# Patient Record
Sex: MALE
[De-identification: names, ages, dates, MRNs, and addresses within clinical notes are randomized per-mention and may not be internally consistent; named-entity substitution may affect disease eponyms.]

---

## 2017-08-24 ENCOUNTER — APPOINTMENT (OUTPATIENT)
Dept: ORTHOPEDIC SURGERY | Facility: CLINIC | Age: 57
End: 2017-08-24
Payer: COMMERCIAL

## 2017-08-24 VITALS
WEIGHT: 180 LBS | SYSTOLIC BLOOD PRESSURE: 137 MMHG | HEART RATE: 56 BPM | HEIGHT: 67 IN | BODY MASS INDEX: 28.25 KG/M2 | DIASTOLIC BLOOD PRESSURE: 78 MMHG

## 2017-08-24 DIAGNOSIS — M25.531 PAIN IN RIGHT WRIST: ICD-10-CM

## 2017-08-24 DIAGNOSIS — M25.532 PAIN IN RIGHT WRIST: ICD-10-CM

## 2017-08-24 DIAGNOSIS — M72.2 PLANTAR FASCIAL FIBROMATOSIS: ICD-10-CM

## 2017-08-24 PROCEDURE — 73110 X-RAY EXAM OF WRIST: CPT | Mod: 50

## 2017-08-24 PROCEDURE — 99214 OFFICE O/P EST MOD 30 MIN: CPT

## 2017-08-24 PROCEDURE — 73650 X-RAY EXAM OF HEEL: CPT | Mod: RT

## 2017-08-24 RX ORDER — CLOPIDOGREL BISULFATE 75 MG/1
75 TABLET, FILM COATED ORAL
Qty: 90 | Refills: 0 | Status: ACTIVE | COMMUNITY
Start: 2017-05-01

## 2017-08-24 RX ORDER — ATORVASTATIN CALCIUM 40 MG/1
40 TABLET, FILM COATED ORAL
Qty: 90 | Refills: 0 | Status: ACTIVE | COMMUNITY
Start: 2017-05-01

## 2020-01-09 ENCOUNTER — APPOINTMENT (OUTPATIENT)
Dept: ORTHOPEDIC SURGERY | Facility: CLINIC | Age: 60
End: 2020-01-09
Payer: COMMERCIAL

## 2020-01-09 VITALS
SYSTOLIC BLOOD PRESSURE: 126 MMHG | HEIGHT: 67 IN | WEIGHT: 190 LBS | HEART RATE: 51 BPM | BODY MASS INDEX: 29.82 KG/M2 | DIASTOLIC BLOOD PRESSURE: 73 MMHG

## 2020-01-09 DIAGNOSIS — S59.902A UNSPECIFIED INJURY OF LEFT ELBOW, INITIAL ENCOUNTER: ICD-10-CM

## 2020-01-09 PROCEDURE — 73080 X-RAY EXAM OF ELBOW: CPT | Mod: LT

## 2020-01-09 PROCEDURE — 99214 OFFICE O/P EST MOD 30 MIN: CPT

## 2020-01-09 NOTE — DISCUSSION/SUMMARY
[de-identified] : The patient has sustained blunt trauma to his left elbow.  His exam is consistent with posttraumatic lateral epicondylitis.  There is a possibility that he has a small nondisplaced fracture at the lateral epicondyle.  The treatment for either condition will be activity modification.  He does not require immobilization.  He is unable to take NSAIDs.  He will apply topical ointments and cold therapy.  He will be reevaluated with new x-rays in 3 weeks.

## 2020-01-09 NOTE — PHYSICAL EXAM
[Rad] : radial 2+ and symmetric bilaterally [Normal] : Alert and in no acute distress [Poor Appearance] : well-appearing [Acute Distress] : not in acute distress [de-identified] : The patient has no respiratory distress. Mood and affect are normal. The patient is alert and oriented to person, place and time.\par Examination of the left shoulder demonstrates no swelling, no deformity and no tenderness. The shoulder is stable. Drop arm test is negative. Irion test is negative. Liftoff test is negative. Motor strength is 5 over 5 in all groups. Range of motion is full and identical to that of the right shoulder. Flexion is 160°, abduction 160°, external rotation 45° and internal rotation to the lower thoracic level.\par The left elbow is tender over the lateral epicondyle. There is no deformity. The elbow is stable. Range of motion is 0-130°. Pronation and supination are full. There is increased lateral elbow pain with wrist extension against resistance. The skin is intact. There is no rash.  There is no lymphedema. [Obese] : not obese [de-identified] : AP, lateral and oblique x-rays of the left elbow demonstrate an area of calcification at the lateral epicondyle.  This has the appearance of calcification of the extensor tendon origin rather than chip fracture.

## 2020-01-09 NOTE — HISTORY OF PRESENT ILLNESS
[de-identified] : Patient presents with left elbow pain after he struck his elbow on the yepez of his car 3 weeks ago. He reports that since this injury he has bumped his elbow on objects around the house and has had several falls onto the elbow while skiing. He occasionally takes Tylenol for pain. He is on Plavix and avoids NSAIDs. Ice exacerbates the pain.

## 2020-01-30 ENCOUNTER — APPOINTMENT (OUTPATIENT)
Dept: ORTHOPEDIC SURGERY | Facility: CLINIC | Age: 60
End: 2020-01-30
Payer: COMMERCIAL

## 2020-01-30 VITALS
SYSTOLIC BLOOD PRESSURE: 120 MMHG | BODY MASS INDEX: 29.82 KG/M2 | DIASTOLIC BLOOD PRESSURE: 73 MMHG | HEIGHT: 67 IN | HEART RATE: 55 BPM | WEIGHT: 190 LBS

## 2020-01-30 PROCEDURE — 99213 OFFICE O/P EST LOW 20 MIN: CPT

## 2020-01-30 PROCEDURE — 73080 X-RAY EXAM OF ELBOW: CPT | Mod: LT

## 2020-02-27 ENCOUNTER — APPOINTMENT (OUTPATIENT)
Dept: ORTHOPEDIC SURGERY | Facility: CLINIC | Age: 60
End: 2020-02-27
Payer: COMMERCIAL

## 2020-02-27 VITALS
SYSTOLIC BLOOD PRESSURE: 108 MMHG | WEIGHT: 190 LBS | HEIGHT: 67 IN | BODY MASS INDEX: 29.82 KG/M2 | HEART RATE: 51 BPM | DIASTOLIC BLOOD PRESSURE: 68 MMHG

## 2020-02-27 DIAGNOSIS — S59.902D UNSPECIFIED INJURY OF LEFT ELBOW, SUBSEQUENT ENCOUNTER: ICD-10-CM

## 2020-02-27 PROCEDURE — 99213 OFFICE O/P EST LOW 20 MIN: CPT

## 2020-02-27 PROCEDURE — 73080 X-RAY EXAM OF ELBOW: CPT | Mod: LT

## 2022-08-30 ENCOUNTER — APPOINTMENT (OUTPATIENT)
Dept: PODIATRY | Facility: CLINIC | Age: 62
End: 2022-08-30
Payer: COMMERCIAL

## 2022-08-30 VITALS — HEIGHT: 67 IN | WEIGHT: 190 LBS | BODY MASS INDEX: 29.82 KG/M2

## 2022-08-30 DIAGNOSIS — R26.2 DIFFICULTY IN WALKING, NOT ELSEWHERE CLASSIFIED: ICD-10-CM

## 2022-08-30 PROCEDURE — 99212 OFFICE O/P EST SF 10 MIN: CPT | Mod: 25

## 2022-08-30 PROCEDURE — 20600 DRAIN/INJ JOINT/BURSA W/O US: CPT | Mod: RT

## 2022-08-31 DIAGNOSIS — Z78.9 OTHER SPECIFIED HEALTH STATUS: ICD-10-CM

## 2022-08-31 DIAGNOSIS — M21.40 FLAT FOOT [PES PLANUS] (ACQUIRED), UNSPECIFIED FOOT: ICD-10-CM

## 2022-08-31 DIAGNOSIS — Z86.69 PERSONAL HISTORY OF OTHER DISEASES OF THE NERVOUS SYSTEM AND SENSE ORGANS: ICD-10-CM

## 2022-08-31 DIAGNOSIS — Z82.49 FAMILY HISTORY OF ISCHEMIC HEART DISEASE AND OTHER DISEASES OF THE CIRCULATORY SYSTEM: ICD-10-CM

## 2022-08-31 DIAGNOSIS — K31.9 DISEASE OF STOMACH AND DUODENUM, UNSPECIFIED: ICD-10-CM

## 2022-08-31 DIAGNOSIS — M25.571 PAIN IN RIGHT ANKLE AND JOINTS OF RIGHT FOOT: ICD-10-CM

## 2022-08-31 RX ORDER — BEMPEDOIC ACID AND EZETIMIBE 180; 10 MG/1; MG/1
180-10 TABLET, FILM COATED ORAL
Refills: 0 | Status: ACTIVE | COMMUNITY

## 2022-09-01 PROBLEM — R26.2 DIFFICULTY IN WALKING: Status: ACTIVE | Noted: 2022-08-31

## 2022-09-02 NOTE — PHYSICAL EXAM
[2+] : left foot dorsalis pedis 2+ [Sensation] : the sensory exam was normal to light touch and pinprick [No Focal Deficits] : no focal deficits [Deep Tendon Reflexes (DTR)] : deep tendon reflexes were 2+ and symmetric [Motor Exam] : the motor exam was normal [FreeTextEntry3] : Within normal limits. [de-identified] : Limitation and crepitation on ROM of the 1st MPJ, right foot.  Pain at the dorsal aspect of the 1st metatarsal, right foot, consistent with a hallux limitus.

## 2022-09-02 NOTE — PROCEDURE
[MTP] : metatarsalphalangeal [Right Foot] : was performed on the right foot [Therapeutic] : therapeutic [1%] : 1%  [27 gauge] : A 27 gauge needle was used [Kenalog 40] : Kenalog 40 [Tolerated Well] : tolerated the procedure well [No Complications] : There were no complications. [Instructions Given] : given handouts/patient instructions [Patient Instructed to Call] : instructed to call if redness at site, a decrease in range of motion or an increase in pain is noted after procedure. [Patient] : the patient [Risks] : risks [Benefits] : benefits [Alternatives] : alternatives [FreeTextEntry1] : X-rays: Did not feel a need for another x-ray.\par \par Impression: Hallux limitus, right foot.\par \par Recommendations: Attempt at another injection.  After discussing all risks, benefits and alternatives, patient consented.\par \par Treatment: Patient was injected with a combination of 1% Xylocaine, plain, 0.5% Marcaine, plain and Kenalog-40, a total of 1.5cc's into the right 1st MPJ.  Patient was given home care instructions.  Any questions or problems, patient is to contact the office. \par \par

## 2022-09-02 NOTE — HISTORY OF PRESENT ILLNESS
[FreeTextEntry1] : Patient presents today for reevaluation of hallux limitus, right.  In May 2022, he had an injection and had symptomatic relief for the whole time.  Just recently, the pain has returned.

## 2023-06-28 ENCOUNTER — APPOINTMENT (OUTPATIENT)
Dept: PODIATRY | Facility: CLINIC | Age: 63
End: 2023-06-28
Payer: COMMERCIAL

## 2023-06-28 DIAGNOSIS — M10.071 IDIOPATHIC GOUT, RIGHT ANKLE AND FOOT: ICD-10-CM

## 2023-06-28 DIAGNOSIS — M20.5X1 OTHER DEFORMITIES OF TOE(S) (ACQUIRED), RIGHT FOOT: ICD-10-CM

## 2023-06-28 PROCEDURE — 99213 OFFICE O/P EST LOW 20 MIN: CPT

## 2023-06-28 PROCEDURE — 73630 X-RAY EXAM OF FOOT: CPT | Mod: RT

## 2023-06-28 RX ORDER — METHYLPREDNISOLONE 4 MG/1
4 TABLET ORAL
Qty: 1 | Refills: 0 | Status: ACTIVE | COMMUNITY
Start: 2023-06-28 | End: 1900-01-01

## 2023-06-30 PROBLEM — M20.5X1 OTHER DEFORMITIES OF TOE(S) (ACQUIRED), RIGHT FOOT: Status: ACTIVE | Noted: 2022-08-31

## 2023-06-30 NOTE — PROCEDURE
[FreeTextEntry1] : X-rays were taken to rule out for fracture.\par (3 views - right foot) There is some dorsal spurring with loose bodies within the joint.  There is some erythema, medially over the bursa.

## 2023-06-30 NOTE — PHYSICAL EXAM
[General Appearance - Alert] : alert [2+] : left foot dorsalis pedis 2+ [Skin Color & Pigmentation] : normal skin color and pigmentation [Skin Turgor] : normal skin turgor [] : no rash [Skin Lesions] : no skin lesions [Sensation] : the sensory exam was normal to light touch and pinprick [No Focal Deficits] : no focal deficits [Deep Tendon Reflexes (DTR)] : deep tendon reflexes were 2+ and symmetric [Motor Exam] : the motor exam was normal [Oriented To Time, Place, And Person] : oriented to person, place, and time [FreeTextEntry3] : Within normal limits. [de-identified] : Limitation and crepitation on ROM of the 1st MPJ, right foot.  Pain at the dorsal aspect of the 1st metatarsal, right foot, consistent with a hallux limitus.   [Foot Ulcer] : no foot ulcer [Skin Induration] : no skin induration

## 2023-06-30 NOTE — HISTORY OF PRESENT ILLNESS
[Sneakers] : diana [FreeTextEntry1] : Patient presents today with pain in the 1st MPJ of the right foot.  He does have a hallux limitus present on that foot but today it is more acute.  It began on Sunday.  There is erythema medially.  Patient has difficulty ambulating due to pain and discomfort.  \par

## 2024-03-31 ENCOUNTER — NON-APPOINTMENT (OUTPATIENT)
Age: 64
End: 2024-03-31